# Patient Record
Sex: MALE | Race: WHITE | NOT HISPANIC OR LATINO | Employment: OTHER | URBAN - METROPOLITAN AREA
[De-identification: names, ages, dates, MRNs, and addresses within clinical notes are randomized per-mention and may not be internally consistent; named-entity substitution may affect disease eponyms.]

---

## 2019-11-11 ENCOUNTER — OFFICE VISIT (OUTPATIENT)
Dept: CARDIOLOGY | Facility: CLINIC | Age: 44
End: 2019-11-11

## 2019-11-11 VITALS
SYSTOLIC BLOOD PRESSURE: 131 MMHG | HEIGHT: 73 IN | HEART RATE: 73 BPM | DIASTOLIC BLOOD PRESSURE: 85 MMHG | BODY MASS INDEX: 25.18 KG/M2 | WEIGHT: 190 LBS

## 2019-11-11 DIAGNOSIS — R00.2 PALPITATIONS: Primary | ICD-10-CM

## 2019-11-11 PROCEDURE — 99204 OFFICE O/P NEW MOD 45 MIN: CPT | Performed by: INTERNAL MEDICINE

## 2019-11-11 PROCEDURE — 93000 ELECTROCARDIOGRAM COMPLETE: CPT | Performed by: INTERNAL MEDICINE

## 2019-11-11 RX ORDER — OMEPRAZOLE 40 MG/1
80 CAPSULE, DELAYED RELEASE ORAL 2 TIMES DAILY
Refills: 6 | COMMUNITY
Start: 2019-09-25

## 2019-11-11 RX ORDER — CHLORAL HYDRATE 500 MG
CAPSULE ORAL
COMMUNITY

## 2019-11-11 RX ORDER — MELOXICAM 7.5 MG/1
7.5 TABLET ORAL DAILY
Refills: 3 | COMMUNITY
Start: 2019-10-29

## 2019-11-11 RX ORDER — ZOLPIDEM TARTRATE 10 MG/1
TABLET ORAL
Refills: 1 | COMMUNITY
Start: 2019-10-14 | End: 2020-02-03

## 2019-11-11 NOTE — PROGRESS NOTES
Date of Office Visit: 2019  Encounter Provider:Dr Rodrigue Lozano  Place of Service: [facility] CARDIOLOGY [place]  Patient Name: Handy Monteiro  :1975  James Singer MD    Chief Complaint   Patient presents with   • Palpitations     consult     History of Present Illness    I am pleased to see Mr. Monteiro in my office today as a new consultation.    Patient is 44 years old white gentleman whose past medical history is insignificant for any medical illness except for gastroesophageal reflux disease who is referred to me for symptom of palpitation.    Patient reports that from last 2 to 4 weeks he is having symptom of palpitation.  Patient described this as a fluttering of the heart with racing of the heart for few seconds.  However this happens in clusters from few hours.  Patient does complain of occasional dizziness with these episode.  Patient denies any chest pain or shortness of breath.  No robert syncope.  No leg edema noted.    Patient does not have previous history of CAD, PCI or MI.  Patient does not have a family history for premature CAD.    EKG showed normal sinus rhythm.  No previous EKG to compare.  No arrhythmia or PVCs noted.    Patient is presented with symptom of palpitation.  I would recommend to proceed with Holter monitor and echocardiogram.  Patient consumes significant amount of caffeine I advised him to decrease that.    History reviewed. No pertinent past medical history.      Past Surgical History:   Procedure Laterality Date   • COLONOSCOPY      9 times   • TONSILLECTOMY     • TUMOR REMOVAL      left abdominal area and right jaw area           Current Outpatient Medications:   •  calcium citrate-vitamin d (CALCITRATE) 315-250 MG-UNIT tablet tablet, Take 1 tablet by mouth Daily., Disp: , Rfl:   •  meloxicam (MOBIC) 7.5 MG tablet, Take 7.5 mg by mouth Daily. FOR 30 DAYS, Disp: , Rfl: 3  •  Omega-3 Fatty Acids (FISH OIL) 1000 MG capsule capsule, Take  by mouth Daily With  "Breakfast., Disp: , Rfl:   •  omeprazole (priLOSEC) 40 MG capsule, Take 80 mg by mouth 2 (Two) Times a Day., Disp: , Rfl: 6  •  zolpidem (AMBIEN) 10 MG tablet, TAKE 1 TABLET BY MOUTH ONCE DAILY AS NEEDED FOR 30 DAYS, Disp: , Rfl: 1      Social History     Socioeconomic History   • Marital status:      Spouse name: Not on file   • Number of children: Not on file   • Years of education: Not on file   • Highest education level: Not on file   Tobacco Use   • Smoking status: Never Smoker   Substance and Sexual Activity   • Alcohol use: No     Frequency: Never   • Drug use: No   • Sexual activity: Defer         Review of Systems   Constitution: Negative for chills and fever.   HENT: Negative for ear discharge and nosebleeds.    Eyes: Negative for discharge and redness.   Cardiovascular: Positive for palpitations. Negative for chest pain, orthopnea, paroxysmal nocturnal dyspnea and syncope.   Respiratory: Negative for cough, shortness of breath and wheezing.    Endocrine: Negative for heat intolerance.   Skin: Negative for rash.   Musculoskeletal: Negative for arthritis and myalgias.   Gastrointestinal: Negative for abdominal pain, melena, nausea and vomiting.   Genitourinary: Negative for dysuria and hematuria.   Neurological: Negative for dizziness, light-headedness, numbness and tremors.   Psychiatric/Behavioral: Negative for depression. The patient is not nervous/anxious.        Procedures    Procedures    No orders to display           Objective:    /85   Pulse 73   Ht 185.4 cm (73\")   Wt 86.2 kg (190 lb)   BMI 25.07 kg/m²         Physical Exam   Constitutional: He is oriented to person, place, and time. He appears well-developed and well-nourished.   HENT:   Head: Normocephalic and atraumatic.   Eyes: No scleral icterus.   Neck: No thyromegaly present.   Cardiovascular: Normal rate, regular rhythm and normal heart sounds. Exam reveals no gallop and no friction rub.   No murmur " heard.  Pulmonary/Chest: Effort normal and breath sounds normal. No respiratory distress. He has no wheezes. He has no rales.   Abdominal: There is no tenderness.   Musculoskeletal: He exhibits no edema.   Lymphadenopathy:     He has no cervical adenopathy.   Neurological: He is alert and oriented to person, place, and time.   Skin: No rash noted. No erythema.   Psychiatric: He has a normal mood and affect.           Assessment:       Diagnosis Plan   1. Palpitations              Plan:       At this stage, I would recommend to proceed with echocardiogram and stress test Holter monitor.  Patient is advised to decrease caffeine intake.

## 2020-01-06 ENCOUNTER — OFFICE VISIT (OUTPATIENT)
Dept: CARDIOLOGY | Facility: CLINIC | Age: 45
End: 2020-01-06

## 2020-01-06 VITALS
HEIGHT: 72 IN | WEIGHT: 193 LBS | BODY MASS INDEX: 26.14 KG/M2 | DIASTOLIC BLOOD PRESSURE: 83 MMHG | HEART RATE: 75 BPM | SYSTOLIC BLOOD PRESSURE: 134 MMHG

## 2020-01-06 DIAGNOSIS — E78.2 MIXED HYPERLIPIDEMIA: ICD-10-CM

## 2020-01-06 DIAGNOSIS — I10 ESSENTIAL HYPERTENSION: ICD-10-CM

## 2020-01-06 DIAGNOSIS — R00.2 PALPITATIONS: Primary | ICD-10-CM

## 2020-01-06 PROBLEM — F41.9 ANXIETY: Status: ACTIVE | Noted: 2020-01-06

## 2020-01-06 PROBLEM — I72.9 ANEURYSM (HCC): Status: ACTIVE | Noted: 2020-01-06

## 2020-01-06 PROBLEM — H66.90 OTITIS MEDIA: Status: ACTIVE | Noted: 2020-01-06

## 2020-01-06 PROBLEM — G47.00 PERSISTENT INSOMNIA: Status: ACTIVE | Noted: 2019-10-23

## 2020-01-06 PROBLEM — I88.9 SUBMANDIBULAR LYMPHADENITIS: Status: ACTIVE | Noted: 2020-01-06

## 2020-01-06 PROBLEM — E78.1 HYPERTRIGLYCERIDEMIA: Status: ACTIVE | Noted: 2017-01-23

## 2020-01-06 PROBLEM — R53.83 FATIGUE: Status: ACTIVE | Noted: 2017-01-18

## 2020-01-06 PROBLEM — M19.90 OSTEOARTHRITIS: Status: ACTIVE | Noted: 2019-09-30

## 2020-01-06 PROBLEM — E55.9 VITAMIN D DEFICIENCY: Status: ACTIVE | Noted: 2017-01-23

## 2020-01-06 PROBLEM — K11.20 SIALOADENITIS: Status: ACTIVE | Noted: 2020-01-06

## 2020-01-06 PROBLEM — E78.5 HYPERLIPIDEMIA: Status: ACTIVE | Noted: 2017-01-23

## 2020-01-06 PROBLEM — G43.909 MIGRAINE: Status: ACTIVE | Noted: 2020-01-06

## 2020-01-06 NOTE — PROGRESS NOTES
Date of Office Visit: 2020  Encounter Provider: Dr. Rodrigue Lozano  Place of Service: [facility] CARDIOLOGY [place]  Patient Name: Handy Monteiro  :1975  James Singer MD    Chief Complaint   Patient presents with   • Palpitations     2 month follow up holter/echo     History of Present Illness    I am pleased to see Mr. Monteiro in my office today as a new consultation.    Patient is 44 years old white gentleman whose past medical history is insignificant for any medical illness except for gastroesophageal reflux disease who is referred to me for symptom of palpitation.    Patient reports that from last 2 to 4 weeks he is having symptom of palpitation.  Patient described this as a fluttering of the heart with racing of the heart for few seconds.  However this happens in clusters from few hours.  Patient does complain of occasional dizziness with these episode.  Patient denies any chest pain or shortness of breath.  No robert syncope.  No leg edema noted.    Patient does not have previous history of CAD, PCI or MI.  Patient does not have a family history for premature CAD.    EKG showed normal sinus rhythm.  No previous EKG to compare.  No arrhythmia or PVCs noted.    Patient is presented with symptom of palpitation.  I would recommend to proceed with Holter monitor and echocardiogram.  Patient consumes significant amount of caffeine I advised him to decrease that.    History reviewed. No pertinent past medical history.    Past Medical History:   Diagnosis Date   • Palpitations          Past Surgical History:   Procedure Laterality Date   • COLONOSCOPY      9 times   • TONSILLECTOMY     • TUMOR REMOVAL      left abdominal area and right jaw area           Current Outpatient Medications:   •  calcium citrate-vitamin d (CALCITRATE) 315-250 MG-UNIT tablet tablet, Take 1 tablet by mouth Daily., Disp: , Rfl:   •  meloxicam (MOBIC) 7.5 MG tablet, Take 7.5 mg by mouth Daily. FOR 30 DAYS, Disp: , Rfl:  3  •  Omega-3 Fatty Acids (FISH OIL) 1000 MG capsule capsule, Take  by mouth Daily With Breakfast., Disp: , Rfl:   •  omeprazole (priLOSEC) 40 MG capsule, Take 80 mg by mouth 2 (Two) Times a Day., Disp: , Rfl: 6  •  zolpidem (AMBIEN) 10 MG tablet, TAKE 1 TABLET BY MOUTH ONCE DAILY AS NEEDED FOR 30 DAYS, Disp: , Rfl: 1      Social History     Socioeconomic History   • Marital status:      Spouse name: Not on file   • Number of children: Not on file   • Years of education: Not on file   • Highest education level: Not on file   Tobacco Use   • Smoking status: Never Smoker   Substance and Sexual Activity   • Alcohol use: No     Frequency: Never   • Drug use: No   • Sexual activity: Defer         ROS    Procedures    Procedures    No orders to display           Objective:    There were no vitals taken for this visit.        Physical Exam        Assessment:       Diagnosis Plan   1. Palpitations     2. Essential hypertension     3. Mixed hyperlipidemia              Plan:

## 2020-02-03 ENCOUNTER — OFFICE VISIT (OUTPATIENT)
Dept: CARDIOLOGY | Facility: CLINIC | Age: 45
End: 2020-02-03

## 2020-02-03 VITALS
BODY MASS INDEX: 26.14 KG/M2 | SYSTOLIC BLOOD PRESSURE: 125 MMHG | HEART RATE: 61 BPM | HEIGHT: 72 IN | DIASTOLIC BLOOD PRESSURE: 82 MMHG | WEIGHT: 193 LBS

## 2020-02-03 DIAGNOSIS — R00.2 PALPITATIONS: Primary | ICD-10-CM

## 2020-02-03 DIAGNOSIS — E78.2 MIXED HYPERLIPIDEMIA: ICD-10-CM

## 2020-02-03 DIAGNOSIS — I10 ESSENTIAL HYPERTENSION: ICD-10-CM

## 2020-02-03 PROCEDURE — 99213 OFFICE O/P EST LOW 20 MIN: CPT | Performed by: INTERNAL MEDICINE

## 2020-02-03 RX ORDER — TEMAZEPAM 15 MG/1
1 CAPSULE ORAL DAILY
COMMUNITY
Start: 2020-01-24

## 2020-02-03 NOTE — PROGRESS NOTES
Date of Office Visit: 2020  Encounter Provider: Dr. Rodrigue Lozano  Place of Service: [facility] CARDIOLOGY [place]  Patient Name: Handy Monteiro  :1975  James Singer MD    Chief Complaint   Patient presents with   • Palpitations     3 month follow up holter/echo   • Hyperlipidemia   • Hypertension     History of Present Illness    I am pleased to see Mr. Monteiro in my office today as a follow-up    Patient is 44 years old white gentleman whose past medical history is insignificant for any medical illness except for gastroesophageal reflux disease who as a follow-up    In 2019, patient was presented to me for symptom of palpitation.  Patient underwent Holter monitor which showed 30 PACs.  No SVT or VT was noted.  No PVCs were present.  Echocardiogram showed EF of 60% and no significant valvular heart disease noted.  Patient was noted to have significant ingestion of caffeine in the form of soda/Coke.  I advised him to decrease soda intake.Patient does not have previous history of CAD, PCI or MI.  Patient does not have a family history for premature CAD.    Since the previous visit, his symptoms of palpitation have improved dramatically.  He had only few very infrequent episodes of skipping of heartbeat.  No racing of the heart.  No chest pain.  No orthopnea, PND, syncope or presyncope.  No leg edema noted.    Patient symptoms have improved since he has decrease caffeine intake.  I encouraged him to continue to decrease his caffeine intake.  I will see the patient as needed.    Past Medical History:   Diagnosis Date   • Hyperlipidemia    • Hypertension    • Palpitations          Past Surgical History:   Procedure Laterality Date   • COLONOSCOPY      9 times   • CONVERTED (HISTORICAL) HOLTER     • ECHO - CONVERTED     • TONSILLECTOMY     • TUMOR REMOVAL      left abdominal area and right jaw area           Current Outpatient Medications:   •  meloxicam (MOBIC) 7.5 MG tablet,  "Take 7.5 mg by mouth Daily. FOR 30 DAYS, Disp: , Rfl: 3  •  Omega-3 Fatty Acids (FISH OIL) 1000 MG capsule capsule, Take  by mouth Daily With Breakfast., Disp: , Rfl:   •  omeprazole (priLOSEC) 40 MG capsule, Take 80 mg by mouth 2 (Two) Times a Day., Disp: , Rfl: 6  •  temazepam (RESTORIL) 15 MG capsule, 1 capsule Daily., Disp: , Rfl:       Social History     Socioeconomic History   • Marital status:      Spouse name: Not on file   • Number of children: Not on file   • Years of education: Not on file   • Highest education level: Not on file   Tobacco Use   • Smoking status: Never Smoker   Substance and Sexual Activity   • Alcohol use: No     Frequency: Never   • Drug use: No   • Sexual activity: Defer         Review of Systems   Constitution: Negative for chills and fever.   HENT: Negative for ear discharge and nosebleeds.    Eyes: Negative for discharge and redness.   Cardiovascular: Positive for palpitations. Negative for chest pain, orthopnea, paroxysmal nocturnal dyspnea and syncope.   Respiratory: Negative for cough, shortness of breath and wheezing.    Endocrine: Negative for heat intolerance.   Skin: Negative for rash.   Musculoskeletal: Negative for arthritis and myalgias.   Gastrointestinal: Negative for abdominal pain, melena, nausea and vomiting.   Genitourinary: Negative for dysuria and hematuria.   Neurological: Negative for dizziness, light-headedness, numbness and tremors.   Psychiatric/Behavioral: Negative for depression. The patient is not nervous/anxious.        Procedures    Procedures    No orders to display           Objective:    /82   Pulse 61   Ht 182 cm (71.65\")   Wt 87.5 kg (193 lb)   BMI 26.43 kg/m²         Physical Exam   Constitutional: He is oriented to person, place, and time. He appears well-developed and well-nourished.   HENT:   Head: Normocephalic and atraumatic.   Eyes: Right eye exhibits no discharge. No scleral icterus.   Neck: No thyromegaly present. "   Cardiovascular: Normal rate, regular rhythm and normal heart sounds. Exam reveals no gallop and no friction rub.   No murmur heard.  Pulmonary/Chest: Effort normal and breath sounds normal. No respiratory distress. He has no wheezes. He has no rales.   Abdominal: There is no tenderness.   Musculoskeletal: He exhibits no edema.   Lymphadenopathy:     He has no cervical adenopathy.   Neurological: He is alert and oriented to person, place, and time.   Skin: No rash noted. No erythema.   Psychiatric: He has a normal mood and affect.           Assessment:       Diagnosis Plan   1. Palpitations     2. Essential hypertension     3. Mixed hyperlipidemia              Plan: